# Patient Record
Sex: FEMALE | Race: WHITE
[De-identification: names, ages, dates, MRNs, and addresses within clinical notes are randomized per-mention and may not be internally consistent; named-entity substitution may affect disease eponyms.]

---

## 2020-08-25 ENCOUNTER — HOSPITAL ENCOUNTER (EMERGENCY)
Dept: HOSPITAL 4 - SED | Age: 80
Discharge: HOME | End: 2020-08-25
Payer: COMMERCIAL

## 2020-08-25 VITALS — WEIGHT: 142 LBS | HEIGHT: 61 IN | BODY MASS INDEX: 26.81 KG/M2

## 2020-08-25 VITALS — SYSTOLIC BLOOD PRESSURE: 128 MMHG
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
## 2020-08-25 DIAGNOSIS — S51.811A: Primary | ICD-10-CM

## 2020-08-25 DIAGNOSIS — I10: ICD-10-CM
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
## 2020-08-25 PROCEDURE — 90715 TDAP VACCINE 7 YRS/> IM: CPT

## 2020-08-25 PROCEDURE — 99283 EMERGENCY DEPT VISIT LOW MDM: CPT

## 2020-08-25 PROCEDURE — 12001 RPR S/N/AX/GEN/TRNK 2.5CM/<: CPT

## 2020-08-25 PROCEDURE — 90471 IMMUNIZATION ADMIN: CPT

## 2020-08-28 ENCOUNTER — HOSPITAL ENCOUNTER (EMERGENCY)
Dept: HOSPITAL 4 - SED | Age: 80
Discharge: HOME | End: 2020-08-28
Payer: COMMERCIAL

## 2020-08-28 VITALS — BODY MASS INDEX: 26.13 KG/M2 | HEIGHT: 62 IN | WEIGHT: 142 LBS

## 2020-08-28 VITALS — SYSTOLIC BLOOD PRESSURE: 147 MMHG
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
## 2020-08-28 DIAGNOSIS — I10: ICD-10-CM

## 2020-08-28 DIAGNOSIS — S51.811D: Primary | ICD-10-CM

## 2020-08-28 NOTE — NUR
pt to bed 3. alert and oriented. wound dressing removed. wound well 
approximated and sutures inplace. No drainage noted.

## 2020-08-28 NOTE — NUR
Patient given written and verbal discharge instructions and verbalizes 
understanding.  ER MD discussed with patient the results and treatment 
provided. Patient in stable condition. ID arm band removed. 

No prescriptions given. Patient educated on pain management and to follow up 
with PMD. Pain Scale 0.

Opportunity for questions provided and answered. Medication side effect fact 
sheet provided.

## 2020-11-07 ENCOUNTER — HOSPITAL ENCOUNTER (INPATIENT)
Dept: HOSPITAL 4 - SED | Age: 80
LOS: 2 days | Discharge: HOME | DRG: 69 | End: 2020-11-09
Payer: COMMERCIAL

## 2020-11-07 VITALS — SYSTOLIC BLOOD PRESSURE: 156 MMHG

## 2020-11-07 VITALS — BODY MASS INDEX: 29.64 KG/M2 | WEIGHT: 157 LBS | HEIGHT: 61 IN

## 2020-11-07 VITALS — SYSTOLIC BLOOD PRESSURE: 167 MMHG

## 2020-11-07 DIAGNOSIS — E78.5: ICD-10-CM

## 2020-11-07 DIAGNOSIS — Z90.710: ICD-10-CM

## 2020-11-07 DIAGNOSIS — E78.00: ICD-10-CM

## 2020-11-07 DIAGNOSIS — G45.9: Primary | ICD-10-CM

## 2020-11-07 DIAGNOSIS — M16.11: ICD-10-CM

## 2020-11-07 DIAGNOSIS — I10: ICD-10-CM

## 2020-11-07 DIAGNOSIS — I25.2: ICD-10-CM

## 2020-11-07 DIAGNOSIS — Z91.013: ICD-10-CM

## 2020-11-07 DIAGNOSIS — Z91.018: ICD-10-CM

## 2020-11-07 DIAGNOSIS — Z20.828: ICD-10-CM

## 2020-11-07 DIAGNOSIS — Z79.02: ICD-10-CM

## 2020-11-07 DIAGNOSIS — J45.909: ICD-10-CM

## 2020-11-07 DIAGNOSIS — Z79.899: ICD-10-CM

## 2020-11-07 DIAGNOSIS — Z88.0: ICD-10-CM

## 2020-11-07 DIAGNOSIS — G90.8: ICD-10-CM

## 2020-11-07 DIAGNOSIS — I25.10: ICD-10-CM

## 2020-11-07 DIAGNOSIS — Z95.5: ICD-10-CM

## 2020-11-07 DIAGNOSIS — Z91.010: ICD-10-CM

## 2020-11-07 DIAGNOSIS — M19.011: ICD-10-CM

## 2020-11-07 LAB
ALBUMIN SERPL BCP-MCNC: 4 G/DL (ref 3.4–4.8)
ALT SERPL W P-5'-P-CCNC: 33 U/L (ref 12–78)
ANION GAP SERPL CALCULATED.3IONS-SCNC: 11 MMOL/L (ref 5–15)
APPEARANCE UR: CLEAR
AST SERPL W P-5'-P-CCNC: 21 U/L (ref 10–37)
BACTERIA URNS QL MICRO: (no result) /HPF
BASOPHILS # BLD AUTO: 0.2 K/UL (ref 0–0.2)
BASOPHILS NFR BLD AUTO: 2.3 % (ref 0–2)
BILIRUB SERPL-MCNC: 0.2 MG/DL (ref 0–1)
BILIRUB UR QL STRIP: NEGATIVE
BUN SERPL-MCNC: 13 MG/DL (ref 8–21)
CALCIUM SERPL-MCNC: 8.8 MG/DL (ref 8.4–11)
CHLORIDE SERPL-SCNC: 104 MMOL/L (ref 98–107)
COLOR UR: YELLOW
CREAT SERPL-MCNC: 0.44 MG/DL (ref 0.55–1.3)
EOSINOPHIL # BLD AUTO: 0.2 K/UL (ref 0–0.4)
EOSINOPHIL NFR BLD AUTO: 3.4 % (ref 0–4)
ERYTHROCYTE [DISTWIDTH] IN BLOOD BY AUTOMATED COUNT: 14.4 % (ref 9–15)
GFR SERPL CREATININE-BSD FRML MDRD: (no result) ML/MIN (ref 90–?)
GLUCOSE SERPL-MCNC: 96 MG/DL (ref 70–99)
GLUCOSE UR STRIP-MCNC: NEGATIVE MG/DL
HCT VFR BLD AUTO: 39.4 % (ref 36–48)
HGB BLD-MCNC: 13.1 G/DL (ref 12–16)
HGB UR QL STRIP: (no result)
INR PPP: 1 (ref 0.8–1.2)
KETONES UR STRIP-MCNC: NEGATIVE MG/DL
LEUKOCYTE ESTERASE UR QL STRIP: NEGATIVE
LYMPHOCYTES # BLD AUTO: 2.2 K/UL (ref 1–5.5)
LYMPHOCYTES NFR BLD AUTO: 30.8 % (ref 20.5–51.5)
MCH RBC QN AUTO: 28 PG (ref 27–31)
MCHC RBC AUTO-ENTMCNC: 33 % (ref 32–36)
MCV RBC AUTO: 86 FL (ref 79–98)
MONOCYTES # BLD MANUAL: 0.8 K/UL (ref 0–1)
MONOCYTES # BLD MANUAL: 11.3 % (ref 1.7–9.3)
NEUTROPHILS # BLD AUTO: 3.7 K/UL (ref 1.8–7.7)
NEUTROPHILS NFR BLD AUTO: 52.2 % (ref 40–70)
NITRITE UR QL STRIP: NEGATIVE
PH UR STRIP: 7 [PH] (ref 5–8)
PLATELET # BLD AUTO: 220 K/UL (ref 130–430)
POTASSIUM SERPL-SCNC: 3.8 MMOL/L (ref 3.5–5.1)
PROT UR QL STRIP: NEGATIVE
PROTHROMBIN TIME: 10.1 SECS (ref 9.5–12.5)
RBC # BLD AUTO: 4.6 MIL/UL (ref 4.2–6.2)
RBC #/AREA URNS HPF: (no result) /HPF (ref 0–3)
SODIUM SERPLBLD-SCNC: 141 MMOL/L (ref 136–145)
SP GR UR STRIP: 1.01 (ref 1–1.03)
UROBILINOGEN UR STRIP-MCNC: 0.2 MG/DL (ref 0.2–1)
WBC # BLD AUTO: 7.1 K/UL (ref 4.8–10.8)
WBC #/AREA URNS HPF: (no result) /HPF (ref 0–3)

## 2020-11-07 PROCEDURE — G0378 HOSPITAL OBSERVATION PER HR: HCPCS

## 2020-11-07 NOTE — NUR
Patient will be admitted to care of Dr. Chao.  Admitted to Tele unit.  Will 
go to room 107.  Belongings list completed.  Complete and up to date summary 
report printed. SBAR report to be given at bedside with opportunity for 
questions.

## 2020-11-07 NOTE — NUR
PT AAO Frisian SPEAKING C/O CHEST PAIN X ONE WEEK THAT BECAME WORSE TODAY. PT 
REPORTS FEELING NUMBNESS IN HER LEFT ARM AND REPORTS PAIN 5-6/10. PT PHYSICIAN 
TOLD HER TO COME IN FOR EVALUATION. V/S STABLE.

## 2020-11-07 NOTE — NUR
PATIENT MOOD 



DURING INITIAL ADMISSION ASSESSMENT, PATIENT WAS ASKED IF SHE HAS BEEN EXPERIENCING ANY 
STRESS. SHE RESPONDED WITH IMMEDIATELY CRYING AND MOURNING ABOUT THE EVENTS HAPPENING IN 
[place] AND [place]. SHE STATES SHE FEELS VERY DEPRESSED BECAUSE SHE HAS FAMILY THERE AND SPENT A 
LARGE AMOUNT OF HER LIFE IN [place].

## 2020-11-07 NOTE — NUR
ADMISSION NOTE

Received patient from ER via gurney. Patient admitted with diagnosis of RULE OUT MYOCARDIAL 
INFARCTION, RULE OUT CVA. Patient is awake, alert, oriented X . Patient oriented to hospital 
room, call light, toileting, pain management and safety-teach back done. Patient informed 
that EUN will be HER nurse and that their room number is 107A. Personal belongings checked 
and Belongings List documented. Call light within reach.

## 2020-11-08 VITALS — SYSTOLIC BLOOD PRESSURE: 124 MMHG

## 2020-11-08 VITALS — SYSTOLIC BLOOD PRESSURE: 115 MMHG

## 2020-11-08 VITALS — SYSTOLIC BLOOD PRESSURE: 114 MMHG

## 2020-11-08 VITALS — SYSTOLIC BLOOD PRESSURE: 129 MMHG

## 2020-11-08 VITALS — SYSTOLIC BLOOD PRESSURE: 119 MMHG

## 2020-11-08 VITALS — SYSTOLIC BLOOD PRESSURE: 106 MMHG

## 2020-11-08 LAB
ANION GAP SERPL CALCULATED.3IONS-SCNC: 11 MMOL/L (ref 5–15)
BASOPHILS # BLD AUTO: 0 K/UL (ref 0–0.2)
BASOPHILS NFR BLD AUTO: 0.5 % (ref 0–2)
BUN SERPL-MCNC: 15 MG/DL (ref 8–21)
CALCIUM SERPL-MCNC: 8.9 MG/DL (ref 8.4–11)
CHLORIDE SERPL-SCNC: 105 MMOL/L (ref 98–107)
CHOLEST SERPL-MCNC: 228 MG/DL (ref ?–200)
CREAT SERPL-MCNC: 0.48 MG/DL (ref 0.55–1.3)
EOSINOPHIL # BLD AUTO: 0.1 K/UL (ref 0–0.4)
EOSINOPHIL NFR BLD AUTO: 0.6 % (ref 0–4)
ERYTHROCYTE [DISTWIDTH] IN BLOOD BY AUTOMATED COUNT: 14.7 % (ref 9–15)
GFR SERPL CREATININE-BSD FRML MDRD: (no result) ML/MIN (ref 90–?)
GLUCOSE SERPL-MCNC: 131 MG/DL (ref 70–99)
HCT VFR BLD AUTO: 39.6 % (ref 36–48)
HDLC SERPL-MCNC: 73 MG/DL (ref 55–?)
HGB BLD-MCNC: 13.2 G/DL (ref 12–16)
LDL CHOLESTEROL: 140 MG/DL (ref ?–100)
LYMPHOCYTES # BLD AUTO: 1.8 K/UL (ref 1–5.5)
LYMPHOCYTES NFR BLD AUTO: 17.7 % (ref 20.5–51.5)
MCH RBC QN AUTO: 29 PG (ref 27–31)
MCHC RBC AUTO-ENTMCNC: 33 % (ref 32–36)
MCV RBC AUTO: 86 FL (ref 79–98)
MONOCYTES # BLD MANUAL: 0.5 K/UL (ref 0–1)
MONOCYTES # BLD MANUAL: 4.6 % (ref 1.7–9.3)
NEUTROPHILS # BLD AUTO: 7.7 K/UL (ref 1.8–7.7)
NEUTROPHILS NFR BLD AUTO: 76.6 % (ref 40–70)
PLATELET # BLD AUTO: 223 K/UL (ref 130–430)
POTASSIUM SERPL-SCNC: 3.9 MMOL/L (ref 3.5–5.1)
RBC # BLD AUTO: 4.6 MIL/UL (ref 4.2–6.2)
SODIUM SERPLBLD-SCNC: 141 MMOL/L (ref 136–145)
TRIGL SERPL-MCNC: 91 MG/DL (ref 30–150)
WBC # BLD AUTO: 10 K/UL (ref 4.8–10.8)

## 2020-11-08 RX ADMIN — Medication SCH MG: at 08:12

## 2020-11-08 NOTE — NUR
RN ROUNDS

PT RESTING IN BED, BREATHING EVEN AND UNLABORED TO ROOM AIR. PT DENIES ANY PAIN AT THIS 
TIME. NO S/S OF ACUTE DISTRESS NOTED. LEFT FA 20G INTACT, SL. CALL LIGHT WITHIN REACH. SIDE 
RAILS UP X3. BED ALARM ON, LOCKED IN LOWEST POSITION. SAFETY AND FALL PRECAUTIONS 
MAINTAINED. WILL CONTINUE TO MONITOR.

## 2020-11-08 NOTE — NUR
Consultation Paged



Reason for Consultation: R/O MI

Was consult called: Y

Person who was notified: Aruna

Consulting Physician: Dr. Koo (Dr. Farrell is on call)

Ordering Physician: Omi Paredes

## 2020-11-08 NOTE — NUR
Swallow Eval Called





Called and left a message to Radha, regarding swallow eval, ordered by Omi Paredes

## 2020-11-08 NOTE — NUR
Opening notes



Pt AAOx4, VSS, no s/s distress noted.  Pt denies any pain.  Neurovasc checks intact.  IV 
saline locked L.FA 20G clear and patent.  Pt updated w/ plan of care re MRI tomorrow, pt 
verbalized understanding.  Pt ambulates to the bathroom, steady gait.  Call light within 
reach.  Bed low, locked, siderails up x2.  To monitor.

## 2020-11-08 NOTE — NUR
ATTENDING MDON CALL, DR BARBER  CANCELLED THE CONSULT FOR SWALLOWING EVAL.  LEFT A MESSAGE 
WITH VIOLA SPEECH THERAPIST.

## 2020-11-08 NOTE — NUR
CLOSING NOTES

PT RESTING IN BED, BREATHING EVEN AND UNLABORED TO ROOM AIR. NO SIGNS OF RESPIRATORY 
DISTRESS NOTED. LEFT FA 20G INTACT, SL. CALL LIGHT WITHIN REACH. SIDE RAILS UP X3. BED ALARM 
ON, LOCKED IN LOWEST POSITION. SAFETY AND FALL PRECAUTIONS MAINTAINED. ALL NEEDS ARE MET 
THROUGHOUT SHIFT. WILL CONTINUE TO MONITOR UNTIL ENDORSE TO DAY SHIFT RN.

## 2020-11-08 NOTE — NUR
Rounds



Pt asleep, easily awakens.  VSS.  No distress noted.  Safety maintained.  Call light within 
reach.  To monitor.

## 2020-11-09 VITALS — SYSTOLIC BLOOD PRESSURE: 112 MMHG

## 2020-11-09 VITALS — SYSTOLIC BLOOD PRESSURE: 134 MMHG

## 2020-11-09 VITALS — SYSTOLIC BLOOD PRESSURE: 113 MMHG

## 2020-11-09 VITALS — SYSTOLIC BLOOD PRESSURE: 120 MMHG

## 2020-11-09 LAB
ANION GAP SERPL CALCULATED.3IONS-SCNC: 8 MMOL/L (ref 5–15)
BASOPHILS # BLD AUTO: 0 K/UL (ref 0–0.2)
BASOPHILS NFR BLD AUTO: 0.3 % (ref 0–2)
BUN SERPL-MCNC: 30 MG/DL (ref 8–21)
CALCIUM SERPL-MCNC: 8.5 MG/DL (ref 8.4–11)
CHLORIDE SERPL-SCNC: 104 MMOL/L (ref 98–107)
CREAT SERPL-MCNC: 0.62 MG/DL (ref 0.55–1.3)
EOSINOPHIL # BLD AUTO: 0.2 K/UL (ref 0–0.4)
EOSINOPHIL NFR BLD AUTO: 3.1 % (ref 0–4)
ERYTHROCYTE [DISTWIDTH] IN BLOOD BY AUTOMATED COUNT: 14.2 % (ref 9–15)
GFR SERPL CREATININE-BSD FRML MDRD: (no result) ML/MIN (ref 90–?)
GLUCOSE SERPL-MCNC: 105 MG/DL (ref 70–99)
HCT VFR BLD AUTO: 37.5 % (ref 36–48)
HGB BLD-MCNC: 12.6 G/DL (ref 12–16)
LYMPHOCYTES # BLD AUTO: 1.5 K/UL (ref 1–5.5)
LYMPHOCYTES NFR BLD AUTO: 22.5 % (ref 20.5–51.5)
MCH RBC QN AUTO: 29 PG (ref 27–31)
MCHC RBC AUTO-ENTMCNC: 34 % (ref 32–36)
MCV RBC AUTO: 85 FL (ref 79–98)
MONOCYTES # BLD MANUAL: 0.7 K/UL (ref 0–1)
MONOCYTES # BLD MANUAL: 10.2 % (ref 1.7–9.3)
NEUTROPHILS # BLD AUTO: 4.2 K/UL (ref 1.8–7.7)
NEUTROPHILS NFR BLD AUTO: 63.9 % (ref 40–70)
PLATELET # BLD AUTO: 194 K/UL (ref 130–430)
POTASSIUM SERPL-SCNC: 3.9 MMOL/L (ref 3.5–5.1)
RBC # BLD AUTO: 4.39 MIL/UL (ref 4.2–6.2)
SODIUM SERPLBLD-SCNC: 138 MMOL/L (ref 136–145)
WBC # BLD AUTO: 6.5 K/UL (ref 4.8–10.8)

## 2020-11-09 RX ADMIN — Medication SCH MG: at 07:59

## 2020-11-09 NOTE — NUR
Spoke with Physician/Called Radiology

spoke with Dr. Hayes, per Dr. Hayes he would like the MRI to be read tonight so patient may 
possibly be discharged home, called radiology and spoke with Khris, informed him that Dr. Hayes 
would like MRI to be read tonight, per Khris he spoke with the radiologist and he will read it 
tonight.

## 2020-11-09 NOTE — NUR
Rounds



Pt asleep, no s/s distress noted.  Call light within reach.  Bed low, locked, siderails up 
x2.  Will continue to monitor.

## 2020-11-09 NOTE — NUR
Opening Note

received SBAR report from night shift RN, patient ambulated to bathroom, steady gait noted, 
no acute distress noted, respirations even and unlabored on room air, educated patient on 
use of call light and asked to call for assistance, patient verbalized understanding, call 
light in reach, educated patient on use of bed alarm for patient safety, patient verbalized 
understanding, patient refusing bed alarm, bed in low and locked position.

## 2020-11-09 NOTE — NUR
Spoke with Physician

spoke with Dr. Hayes, informed him of MRI brain/head results, per Dr. Hayes patient is cleared 
for discharge home on plavix and aspirin, follow up with Dr. Hayes in two weeks, verified with 
read back, paged Dr. Helm for discharge order, awaiting call back.

## 2020-11-09 NOTE — NUR
to MRI

patient taken to MRI via wheelchair, respirations even and unlabored, no acute distress 
noted.

## 2020-11-09 NOTE — NUR
RN Rounds

patient resting in bed, respirations even and unlabored on room air, patient denies any 
chest pain or shortness of breath, patient denies any numbness, weakness, or tingling, no 
acute distress noted.

## 2020-11-09 NOTE — NUR
Called Radiology

Dr. Rey ADHIKARI at bedside, patient states this is her brother, per patient and her brother 
they would like MRI scan read as soon as possible so that patient can be discharged home, 
called radiology, spoke with Khris, informed him that patient is requesting to have MRI 
results read tonight so that she can be discharged home, per Khris he will follow up with the 
radiologist, informed patient and her brother, patient and her brother verbalized 
understanding.

## 2020-11-09 NOTE — NUR
D/C Patient: 



Patient given medication reconciliation form and D/C instructions. Exit Care provided. 
Patient verbalized understanding. MD discussed with patient the results and treatment 
provided. Ambulatory with steady gait for discharge to home. Patient in stable condition, ID 
band removed. IV catheter removed, intact and dressing applied, no active bleeding. Patient 
educated on pain management. All belongings sent with patient.

## 2020-11-09 NOTE — NUR
Discharge Order

informed patient of discharge order, patient verbalized understanding, informed patient of 
medications for discharge home including new orders for over the counter aspirin, patient 
verbalized understanding, informed patient to follow up with Dr. Hayes in two weeks, patient 
verbalized understanding, patient requesting to have RN go over discharge packet with her 
son Paco, called patients son Paco, educated him on medications for discharge home including 
new orders for over the counter aspirin, informed him to have patient follow up with Dr. Hayes 
in two weeks, Paco verbalized understanding, allowed time for questions, per Paco he will be 
in to  his mother for discharge home in about twenty minutes, educated patient on 
purpose and procedure for IV catheter removed, patient verbalized understanding, IV catheter 
removed, catheter intact, no bleeding, patient getting dressed for discharge home.

## 2020-11-09 NOTE — NUR
Spoke with Physician

spoke with Dr. Helm, informed him that per Dr. Hayes patient is cleared for discharge home on 
plavix and aspirin, new orders received, verified with read back.

## 2020-11-09 NOTE — NUR
Initial Note:



Patient is walking around by bedside. Home meds from pharmacy returned to patient at this 
time. No acute distress. Even, nonlabored breathing on room air. Bed is locked at lowest 
position. Side rails up x2. Will continue with plan of care.

## 2020-11-09 NOTE — NUR
Closing notes



Pt asleep, easily awakens, no s/s distress noted.  Pt denies chest pain.   IV saline locked 
L.FA 20G clear and patent.  Call light within reach.  Bed low, locked, siderails up x2.  To 
endorse to AM nurse.

## 2020-11-09 NOTE — NUR
Closing Note

SBAR report given to receiving RN, patient resting in bed, respirations even and unlabored 
on room air, no acute distress noted, patient awaiting jeffrey Antonio for discharge home, educated 
patient on use of call light and asked to call for assistance, patient verbalized 
understanding, call light in reach, educted patient on use of bed alarm for patient safety, 
patient verbalized understanding, patient refusing bed alarm, bed in low and locked 
position, care endorsed to Minnie RN.

## 2020-11-09 NOTE — NUR
CONSULTATION PAGED/CALLED

Reason for Consultation: []  CVA VS TIA

Person Who was Notified: []  BARRERA

Consulting Physician: []   DR PAUL

Consultant Specialty: []  NEURO

Ordering Physician: []  DR COLÓN

## 2020-11-09 NOTE — NUR
back from MRI

patient brought back to room from MRI via wheelchair, patient resting in bed, respirations 
even and unlabored on room air, no acute distress noted, patient denies any pain.

## 2020-11-10 NOTE — NUR
Discharge Follow Up call:



MSW phoned pt and spoke with son Harjit @ [phone]. Per Harjit, pt is "doing great". Pt was 
able to fill her prescription and is aware that a follow up appointment is needed for neuro 
and cardio. Pt will be utilizing her own neurologist and cardiologist. Pt does not have any 
questions/concerns about the discharge instructions. No further SS needs identified at this 
time.
